# Patient Record
Sex: FEMALE | Race: OTHER | URBAN - METROPOLITAN AREA
[De-identification: names, ages, dates, MRNs, and addresses within clinical notes are randomized per-mention and may not be internally consistent; named-entity substitution may affect disease eponyms.]

---

## 2022-06-20 NOTE — PROGRESS NOTES
Toyin Tate (: 1980) is a 39 y.o. female, new to provider, here for:    ASSESSMENT/PLAN:    ICD-10-CM ICD-9-CM   1. Establishing care with new doctor, encounter for  Z76.89 V65.8   2. Routine adult health maintenance  Z00.00 V70.0   3. Need for hepatitis C screening test  Z11.59 V73.89   4. Sensitivity to the cold  R68.89 780.99   5. Uses Frisian as primary spoken language  Z78.9 V40.1   6. Multiple environmental allergies  Z91.09 V15.09   7. Seasonal allergic rhinitis, unspecified trigger  J30.2 477.9   8. Encounter for screening mammogram for malignant neoplasm of breast  Z12.31 V76.12   9. Positive PPD  R76.11 795.51     #Sensitivity to cold x years   Will eval thyroid and ferritin    #Hx PPD positive  Rec review CXR 21 without acute cardiopulmonary disease    #Multiple environmental and food allergies  With trigger of #seasonal change    Reports highly positive prior allergy testing, unsure of results (not available for review) interested in reestablishing care. Believes may have received ?immunotherapy previously (series of shots over a period of time) but was lost to follow up. Also with trigger of seasonal changes. Referred 22    #Low grade squamous intraepithelial lesion   Rec review PAP 20 - LOST to F/U with findings of LSIL (?no hpv testing)  Reviewed need to update ASAP to reevaluate and agreeable to schedule in office to help with language barrier for PAP with cotesting. #Uses Frisian as primary spoken language  [ entire visit conducted in [address] [facility] ]     Orders Placed This Encounter    DEBRA 3D THEODORE W MAMMO BI SCREENING INCL CAD     Reports prior exam (many years ago) not available for review without issue. Standing Status:   Future     Standing Expiration Date:   2023     Scheduling Instructions:      sentara     Order Specific Question:   Is Patient Pregnant?      Answer:   No     Order Specific Question:   Reason for Exam     Answer:   screening    HEMOGLOBIN A1C WITH EAG     Standing Status:   Future     Number of Occurrences:   1     Standing Expiration Date:   6/21/2023    CBC WITH AUTOMATED DIFF     Standing Status:   Future     Number of Occurrences:   1     Standing Expiration Date:   4/15/9479    METABOLIC PANEL, COMPREHENSIVE     Standing Status:   Future     Number of Occurrences:   1     Standing Expiration Date:   6/21/2023    T4, FREE     Standing Status:   Future     Number of Occurrences:   1     Standing Expiration Date:   6/21/2023    LIPID PANEL     Standing Status:   Future     Number of Occurrences:   1     Standing Expiration Date:   6/21/2023    TSH 3RD GENERATION     Standing Status:   Future     Number of Occurrences:   1     Standing Expiration Date:   6/21/2023    VITAMIN D, 25 HYDROXY     Standing Status:   Future     Number of Occurrences:   1     Standing Expiration Date:   6/21/2023    HEPATITIS C AB     Standing Status:   Future     Number of Occurrences:   1     Standing Expiration Date:   6/22/2023    FERRITIN     Standing Status:   Future     Number of Occurrences:   1     Standing Expiration Date:   6/22/2023    REFERRAL TO ALLERGY     Referral Priority:   Routine     Referral Type:   Consultation     Referral Reason:   Specialty Services Required     Number of Visits Requested:   1     Return in about 1 month (around 7/21/2022) for 30 min follow up, lab review, PAP. SUBJECTIVE/OBJECTIVE:  Extensive review of medical history and medications completed to facilitate transfer of care. No current outpatient medications on file. No current facility-administered medications for this visit. Visit Vitals  BP (!) 100/57 (BP 1 Location: Right arm, BP Patient Position: Sitting, BP Cuff Size: Adult)   Pulse 60   Temp 97.8 °F (36.6 °C) (Temporal)   Resp 16   Ht 4' 10\" (1.473 m)   Wt 112 lb 3.2 oz (50.9 kg)   SpO2 99%   BMI 23.45 kg/m²     Physical Exam  Vitals and nursing note reviewed.    Constitutional:       General: She is not in acute distress. Interventions: Face mask in place. Cardiovascular:      Rate and Rhythm: Normal rate. Pulses: Normal pulses. Heart sounds: Normal heart sounds. Pulmonary:      Effort: Pulmonary effort is normal. No respiratory distress. Breath sounds: Normal breath sounds. Neurological:      Mental Status: She is alert and oriented to person, place, and time. Gait: Gait normal.   Psychiatric:         Mood and Affect: Mood normal.         Behavior: Behavior normal.         Thought Content:  Thought content normal.         Judgment: Judgment normal.             Pretty Ortega,   Family Medicine  06/21/2022

## 2022-06-21 ENCOUNTER — APPOINTMENT (OUTPATIENT)
Dept: FAMILY MEDICINE CLINIC | Age: 42
End: 2022-06-21

## 2022-06-21 ENCOUNTER — OFFICE VISIT (OUTPATIENT)
Dept: FAMILY MEDICINE CLINIC | Age: 42
End: 2022-06-21
Payer: COMMERCIAL

## 2022-06-21 VITALS
SYSTOLIC BLOOD PRESSURE: 100 MMHG | HEIGHT: 58 IN | HEART RATE: 60 BPM | OXYGEN SATURATION: 99 % | RESPIRATION RATE: 16 BRPM | WEIGHT: 112.2 LBS | DIASTOLIC BLOOD PRESSURE: 57 MMHG | TEMPERATURE: 97.8 F | BODY MASS INDEX: 23.55 KG/M2

## 2022-06-21 DIAGNOSIS — Z11.59 NEED FOR HEPATITIS C SCREENING TEST: ICD-10-CM

## 2022-06-21 DIAGNOSIS — Z91.09 MULTIPLE ENVIRONMENTAL ALLERGIES: ICD-10-CM

## 2022-06-21 DIAGNOSIS — Z78.9 USES SPANISH AS PRIMARY SPOKEN LANGUAGE: ICD-10-CM

## 2022-06-21 DIAGNOSIS — J30.2 SEASONAL ALLERGIC RHINITIS, UNSPECIFIED TRIGGER: ICD-10-CM

## 2022-06-21 DIAGNOSIS — Z00.00 ROUTINE ADULT HEALTH MAINTENANCE: ICD-10-CM

## 2022-06-21 DIAGNOSIS — Z76.89 ESTABLISHING CARE WITH NEW DOCTOR, ENCOUNTER FOR: Primary | ICD-10-CM

## 2022-06-21 DIAGNOSIS — R76.11 POSITIVE PPD: ICD-10-CM

## 2022-06-21 DIAGNOSIS — Z12.31 ENCOUNTER FOR SCREENING MAMMOGRAM FOR MALIGNANT NEOPLASM OF BREAST: ICD-10-CM

## 2022-06-21 DIAGNOSIS — R68.89 SENSITIVITY TO THE COLD: ICD-10-CM

## 2022-06-21 PROCEDURE — 99204 OFFICE O/P NEW MOD 45 MIN: CPT | Performed by: STUDENT IN AN ORGANIZED HEALTH CARE EDUCATION/TRAINING PROGRAM

## 2022-06-21 NOTE — PATIENT INSTRUCTIONS
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 La displasia del lemuel uterino (o displasia cervical) es un cambio en algunas de las células del lemuel uterino. Estas células anormales no son cancerosas. En la [employer], la displasia desaparece por sí jose juan y no causa problemas. [place] en algunos casos, la displasia se convierte en cáncer a lo willie de un período de años. Si tim médico detecta la displasia temprano, es posible que usted pueda esperar y mone qué aspecto tiene en tim próxima prueba de detección. Si la displasia es grave, es mejor tratarla. ¿Qué ocurre cuando usted tiene displasia de lemuel uterino? La displasia del lemuel uterino suele ser causada por kathrine infección del virus del papiloma humano (VPH). Muchos tipos de VPH causan verrugas, y unos pocos tipos provocan cáncer. Al igual que la displasia, el VPH con frecuencia desaparece sin tratamiento. Si en kathrine prueba de VPH se encuentra que usted tiene un tipo de VPH de alto riesgo, el médico puede sugerirle que:  · Espere varios meses y repita la prueba para averiguar si el virus y la displasia guerrero desaparecido por sí solos. · Se felix otras pruebas. Kathrine colposcopia le permite al médico observar el lemuel uterino a través de un instrumento de [employer]. El médico también podría moose kathrine muestra de las células para analizarlas. · Trate la displasia. Incluso después del [place], el VPH puede permanecer dentro del organismo. Y la displasia a veces reaparece. Por eso es importante darle seguimiento con tim médico y hacerse pruebas de detección con regularidad. ¿Cómo puede prevenir la displasia del lemuel uterino? · Use condones de látex cada vez que tenga relaciones sexuales. Úselos desde el inicio hasta el final del contacto sexual. [place] ayudará a prevenir kathrine infección con el VPH.   · Si tiene 26 años de edad o menos, puede aplicarse la serie de vacunas contra el VPH. [place] contra los principales tipos de VPH que pueden causar cáncer del lemuel uterino. · Asegúrese de informar a tim narinder o parejas sexuales si usted tiene VPH. Incluso si no tiene síntomas, de todos modos puede transmitir el VPH a otras personas. ¿Cómo se trata la displasia del lemuel uterino? Si fuera necesario, tim médico puede tratar la displasia del lemule uterino leve congelando las células anormales o tratándolas con un láser. La displasia más grave suele tratarse extirpando las células anormales. La atención de seguimiento es kathrine parte clave de tim tratamiento y seguridad. Asegúrese de hacer y acudir a todas las citas, y llame a tim médico si está teniendo problemas. También es kathrine buena idea saber los resultados de mamta exámenes y mantener kathrine lista de los medicamentos que tawanna. ¿Dónde puede encontrar más información en inglés? 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 \"  Revisado: 8 septiembre, 2021               Versión del contenido: 13.2  © 2006-2022 Healthwise, Incorporated. Las instrucciones de cuidado fueron adaptadas bajo licencia por [employer] (which disclaims liability or warranty for this information). Si usted tiene [place] afección médica o sobre estas instrucciones, siempre pregunte a tim profesional de brunilda. Healthwise, Incorporated niega toda garantía o responsabilidad por tim uso de esta información.

## 2022-06-21 NOTE — PROGRESS NOTES
Marya Sesay is a 39 y.o. female (: 1980) presenting to address:    Chief Complaint   Patient presents with    Establish Care       Vitals:    22 1323   BP: (!) 100/57   Pulse: 60   Resp: 16   Temp: 97.8 °F (36.6 °C)   TempSrc: Temporal   SpO2: 99%   Weight: 112 lb 3.2 oz (50.9 kg)   Height: 4' 10\" (1.473 m)   PainSc:   0 - No pain       Hearing/Vision:   No exam data present    Learning Assessment:     Learning Assessment 2022   PRIMARY LEARNER Patient   HIGHEST LEVEL OF EDUCATION - PRIMARY LEARNER  GRADUATED HIGH SCHOOL OR GED   BARRIERS PRIMARY LEARNER NONE   CO-LEARNER CAREGIVER No   PRIMARY LANGUAGE [place]an   LEARNER PREFERENCE PRIMARY LISTENING   ANSWERED BY self   RELATIONSHIP SELF     Depression Screening:     3 most recent PHQ Screens 2022   Little interest or pleasure in doing things Not at all   Feeling down, depressed, irritable, or hopeless Not at all   Total Score PHQ 2 0     Fall Risk Assessment:     Fall Risk Assessment, last 12 mths 2022   Able to walk? Yes   Fall in past 12 months? 0   Do you feel unsteady? 0   Are you worried about falling 0     Abuse Screening:     Abuse Screening Questionnaire 2022   Do you ever feel afraid of your partner? N   Are you in a relationship with someone who physically or mentally threatens you? N   Is it safe for you to go home? Y     ADL Assessment:   No flowsheet data found. Coordination of Care Questionaire:   1. \"Have you been to the ER, urgent care clinic since your last visit? Hospitalized since your last visit? \" No    2. \"Have you seen or consulted any other health care providers outside of the [address] [place] [address] since your last visit? \" No     3. For patients aged 39-70: Has the patient had a colonoscopy / FIT/ Cologuard? NA - based on age      If the patient is female:    4. For patients aged 41-77: Has the patient had a mammogram within the past 2 years? No  See top three    5. For patients aged 21-65:  Has the patient had a pap smear? No    Advanced Directive:   1. Do you have an Advanced Directive? NO    2. Would you like information on Advanced Directives?  NO

## 2022-06-22 LAB
25(OH)D3 SERPL-MCNC: 33.3 NG/ML (ref 32–100)
A-G RATIO,AGRAT: 2.5 RATIO (ref 1.1–2.6)
ABSOLUTE LYMPHOCYTE COUNT, 10803: 1.8 K/UL (ref 1–4.8)
ALBUMIN SERPL-MCNC: 4.7 G/DL (ref 3.5–5)
ALP SERPL-CCNC: 85 U/L (ref 25–115)
ALT SERPL-CCNC: 14 U/L (ref 5–40)
ANION GAP SERPL CALC-SCNC: 12 MMOL/L (ref 3–15)
AST SERPL W P-5'-P-CCNC: 18 U/L (ref 10–37)
AVG GLU, 10930: 111 MG/DL (ref 91–123)
BASOPHILS # BLD: 0 K/UL (ref 0–0.2)
BASOPHILS NFR BLD: 1 % (ref 0–2)
BILIRUB SERPL-MCNC: 1.1 MG/DL (ref 0.2–1.2)
BUN SERPL-MCNC: 14 MG/DL (ref 6–22)
CALCIUM SERPL-MCNC: 9.5 MG/DL (ref 8.4–10.5)
CHLORIDE SERPL-SCNC: 102 MMOL/L (ref 98–110)
CHOLEST SERPL-MCNC: 170 MG/DL (ref 110–200)
CO2 SERPL-SCNC: 27 MMOL/L (ref 20–32)
CREAT SERPL-MCNC: 0.6 MG/DL (ref 0.5–1.2)
EOSINOPHIL # BLD: 0.1 K/UL (ref 0–0.5)
EOSINOPHIL NFR BLD: 1 % (ref 0–6)
ERYTHROCYTE [DISTWIDTH] IN BLOOD BY AUTOMATED COUNT: 13.7 % (ref 10–15.5)
FERRITIN SERPL-MCNC: 38 NG/ML (ref 10–291)
GLOBULIN,GLOB: 1.9 G/DL (ref 2–4)
GLOMERULAR FILTRATION RATE: >60 ML/MIN/1.73 SQ.M.
GLUCOSE SERPL-MCNC: 111 MG/DL (ref 70–99)
GRANULOCYTES,GRANS: 61 % (ref 40–75)
HBA1C MFR BLD HPLC: 5.5 % (ref 4.8–5.6)
HCT VFR BLD AUTO: 39.9 % (ref 35.1–46.5)
HCV AB SER IA-ACNC: NORMAL
HDLC SERPL-MCNC: 2.5 MG/DL (ref 0–5)
HDLC SERPL-MCNC: 69 MG/DL
HGB BLD-MCNC: 12 G/DL (ref 11.7–15.5)
LDL/HDL RATIO,LDHD: 1.3
LDLC SERPL CALC-MCNC: 89 MG/DL (ref 50–99)
LYMPHOCYTES, LYMLT: 32 % (ref 20–45)
MCH RBC QN AUTO: 27 PG (ref 26–34)
MCHC RBC AUTO-ENTMCNC: 30 G/DL (ref 31–36)
MCV RBC AUTO: 89 FL (ref 80–99)
MONOCYTES # BLD: 0.3 K/UL (ref 0.1–1)
MONOCYTES NFR BLD: 5 % (ref 3–12)
NEUTROPHILS # BLD AUTO: 3.4 K/UL (ref 1.8–7.7)
NON-HDL CHOLESTEROL, 011976: 101 MG/DL
PLATELET # BLD AUTO: 235 K/UL (ref 140–440)
PMV BLD AUTO: 12.6 FL (ref 9–13)
POTASSIUM SERPL-SCNC: 3.7 MMOL/L (ref 3.5–5.5)
PROT SERPL-MCNC: 6.6 G/DL (ref 6.4–8.3)
RBC # BLD AUTO: 4.51 M/UL (ref 3.8–5.2)
SODIUM SERPL-SCNC: 141 MMOL/L (ref 133–145)
T4 FREE SERPL-MCNC: 1.3 NG/DL (ref 0.9–1.8)
TRIGL SERPL-MCNC: 59 MG/DL (ref 40–149)
TSH SERPL DL<=0.005 MIU/L-ACNC: 0.55 MCU/ML (ref 0.27–4.2)
VLDLC SERPL CALC-MCNC: 12 MG/DL (ref 8–30)
WBC # BLD AUTO: 5.5 K/UL (ref 4–11)

## 2022-06-24 NOTE — PROGRESS NOTES
Not active [employer] user, can you call with result? I reviewed your recent labs and overall they look great. Your kidney, liver and thyroid were all normal. Your A1C (sugar average for 3 months) and bad cholesterol (LDL) levels were good. If you have any questions or concerns we can discuss at follow up 7/18/22.

## 2022-07-09 NOTE — PROGRESS NOTES
Fariba Stoddard (: 1980) is a 39 y.o. female, established patient, here for:    ICD-10-CM ICD-9-CM   1. Cervical cancer screening  Z12.4 V76.2   2. Uses Kittitian as primary spoken language  Z78.9 V40.1   3. Multiple environmental allergies  Z91.09 V15.09     Assessment   Plan     #Iron deficiency   Reviewed diagnosis and supplement recommendations. Goal Ferritin >75  Lab Results   Component Value Date/Time    Ferritin 38 2022 02:10 PM     #Hx PPD positive  Rec review CXR 21 without acute cardiopulmonary disease     #Multiple environmental and food allergies  With trigger of #seasonal change    Reports highly positive prior allergy testing, unsure of results (not available for review) interested in reestablishing care. Believes may have received ?immunotherapy previously (series of shots over a period of time) but was lost to follow up. Also with trigger of seasonal changes. Referred 22     #Cervical CA screening  #Low grade squamous intraepithelial lesion   Rec review PAP 20 - LOST to F/U with findings of LSIL (?no hpv testing)  HPV + PAP collected in office     #Uses Kittitian as primary spoken language  [ entire visit conducted in [address] [facility] ]         Orders Placed This Encounter    CANDICE PEREZ HPV     Standing Status:   Future     Standing Expiration Date:   7/10/2023     Order Specific Question:   Pap Source? Answer:   Cervical     Order Specific Question:   Total Hysterectomy? Answer:   No     Order Specific Question:   Supracervical Hysterectomy? Answer:   No     Order Specific Question:   Post Menopausal?     Answer:   No     Order Specific Question:   Hormone Therapy? Answer:   No     Order Specific Question:   IUD? Answer:   No     Order Specific Question:   Abnormal Bleeding? Answer:   No     Order Specific Question:   Pregnant     Answer:   No     Order Specific Question:   Post Partum?      Answer:   No     Order Specific Question:   Pap collection method? Answer:   Brush-Spatula     Return in about 1 year (around 7/18/2023) for 30 min follow up, routine care with PCP. Subjective   Last PCP visit: 6/21/2022  Since last visit:   Any changes in health, procedures, hospital visits, or specialist visits? Denies  Tolerating medications without adverse reactions? Not on medications      No current outpatient medications   Objective   Visit Vitals  BP (!) 95/59 (BP 1 Location: Right arm, BP Patient Position: Sitting, BP Cuff Size: Adult)   Pulse 71   Temp 98.3 °F (36.8 °C) (Temporal)   Resp 16   Ht 4' 10\" (1.473 m)   Wt 114 lb 4.8 oz (51.8 kg)   SpO2 97%   BMI 23.89 kg/m²     Physical Exam  Vitals and nursing note reviewed. Constitutional:       General: She is not in acute distress. Interventions: Face mask in place. Cardiovascular:      Rate and Rhythm: Normal rate. Pulses: Normal pulses. Heart sounds: Normal heart sounds. Pulmonary:      Effort: Pulmonary effort is normal. No respiratory distress. Breath sounds: Normal breath sounds. Neurological:      Mental Status: She is alert and oriented to person, place, and time. Gait: Gait normal.   Psychiatric:         Mood and Affect: Mood normal.         Behavior: Behavior normal.         Thought Content:  Thought content normal.         Judgment: Judgment normal.            Valentina Braun, DO  Family Medicine  07/18/2022

## 2022-07-09 NOTE — PATIENT INSTRUCTIONS
[facility]  Phone: [phone]  Fax: [phone]    Iron-Rich Diet: Care Instructions  Your Care Instructions     Your body needs iron to make hemoglobin. Hemoglobin is a substance in red blood cells that carries oxygen from the lungs to cells all through your body. If you do not get enough iron, your body makes fewer and smaller red blood cells. As a result, your body's cells may not get enough oxygen. Adult men need 8 milligrams of iron a day; adult women need 18 milligrams of iron a day. After menopause, women need 8 milligrams of iron a day. A pregnant woman needs 27 milligrams of iron a day. Infants and young children have higher iron needs relative to their size than other age groups. People who have lost blood because of ulcers or heavy menstrual periods may become very low in iron and may develop anemia. Most people can get the iron their bodies need by eating enough of certain iron-rich foods. Your doctor may recommend that you take an iron supplement along with eating an iron-rich diet. Follow-up care is a key part of your treatment and safety. Be sure to make and go to all appointments, and call your doctor if you are having problems. It's also a good idea to know your test results and keep a list of the medicines you take. How can you care for yourself at home? · Make iron-rich foods a part of your daily diet. Iron-rich foods include:  ? All meats, such as chicken, beef, lamb, pork, fish, and shellfish. Liver is especially high in iron. ? Leafy green vegetables. ? Raisins, peas, beans, lentils, barley, and eggs. ? Iron-fortified breakfast cereals. · Eat foods with vitamin C along with iron-rich foods. Vitamin C helps you absorb more iron from food. Drink a glass of orange juice or another citrus juice with your food. · Eat meat and vegetables or grains together. The iron in meat helps your body absorb the iron in other foods. Where can you learn more?   Go to http://www.gray.com/  Enter Z290 in the search box to learn more about \"Iron-Rich Diet: Care Instructions. \"  Current as of: September 8, 2021               Content Version: 13.2  © 7932-9043 Healthwise, [employer]. Care instructions adapted under license by [employer] (which disclaims liability or warranty for this information). If you have questions about a medical condition or this instruction, always ask your healthcare professional. [address] any warranty or liability for your use of this information.

## 2022-07-18 ENCOUNTER — OFFICE VISIT (OUTPATIENT)
Dept: FAMILY MEDICINE CLINIC | Age: 42
End: 2022-07-18
Payer: COMMERCIAL

## 2022-07-18 VITALS
SYSTOLIC BLOOD PRESSURE: 95 MMHG | TEMPERATURE: 98.3 F | HEIGHT: 58 IN | RESPIRATION RATE: 16 BRPM | BODY MASS INDEX: 23.99 KG/M2 | OXYGEN SATURATION: 97 % | HEART RATE: 71 BPM | WEIGHT: 114.3 LBS | DIASTOLIC BLOOD PRESSURE: 59 MMHG

## 2022-07-18 DIAGNOSIS — Z91.09 MULTIPLE ENVIRONMENTAL ALLERGIES: ICD-10-CM

## 2022-07-18 DIAGNOSIS — Z12.4 CERVICAL CANCER SCREENING: Primary | ICD-10-CM

## 2022-07-18 DIAGNOSIS — Z78.9 USES SPANISH AS PRIMARY SPOKEN LANGUAGE: ICD-10-CM

## 2022-07-18 PROCEDURE — 99214 OFFICE O/P EST MOD 30 MIN: CPT | Performed by: STUDENT IN AN ORGANIZED HEALTH CARE EDUCATION/TRAINING PROGRAM

## 2022-07-18 NOTE — PROGRESS NOTES
Edwar Last is a 39 y.o. female (: 1980) presenting to address:    Chief Complaint   Patient presents with    Well Woman       Vitals:    22 0711   BP: (!) 95/59   Pulse: 71   Resp: 16   Temp: 98.3 °F (36.8 °C)   TempSrc: Temporal   SpO2: 97%   Weight: 114 lb 4.8 oz (51.8 kg)   Height: 4' 10\" (1.473 m)   PainSc:   0 - No pain       Hearing/Vision:   No exam data present    Learning Assessment:     Learning Assessment 2022   PRIMARY LEARNER Patient   HIGHEST LEVEL OF EDUCATION - PRIMARY LEARNER  GRADUATED HIGH SCHOOL OR GED   BARRIERS PRIMARY LEARNER NONE   CO-LEARNER CAREGIVER No   PRIMARY LANGUAGE Malay   LEARNER PREFERENCE PRIMARY LISTENING   ANSWERED BY self   RELATIONSHIP SELF     Depression Screening:     3 most recent PHQ Screens 2022   Little interest or pleasure in doing things Not at all   Feeling down, depressed, irritable, or hopeless Not at all   Total Score PHQ 2 0     Fall Risk Assessment:     Fall Risk Assessment, last 12 mths 2022   Able to walk? Yes   Fall in past 12 months? 0   Do you feel unsteady? 0   Are you worried about falling 0     Abuse Screening:     Abuse Screening Questionnaire 2022   Do you ever feel afraid of your partner? N   Are you in a relationship with someone who physically or mentally threatens you? N   Is it safe for you to go home? Y     ADL Assessment:   No flowsheet data found. Coordination of Care Questionaire:   1. \"Have you been to the ER, urgent care clinic since your last visit? Hospitalized since your last visit? \" No    2. \"Have you seen or consulted any other health care providers outside of the [address] [place] [address] since your last visit? \" No     3. For patients aged 39-70: Has the patient had a colonoscopy / FIT/ Cologuard? NA - based on age      If the patient is female:    4. For patients aged 41-77: Has the patient had a mammogram within the past 2 years? Yes - no Care Gap present  See top three    5.  For patients aged 21-65: Has the patient had a pap smear? No    Advanced Directive:   1. Do you have an Advanced Directive? NO    2. Would you like information on Advanced Directives?  NO

## 2022-07-19 LAB
HPV HIGH RISK, 507303: NEGATIVE
PAP IMAGE GUIDED, 8900296: NORMAL

## 2022-07-19 NOTE — PROGRESS NOTES
Not active [employer] user, can you call with result? I'm happy to report your recent PAP came back negative for cancer cells and negative for HPV. We can repeat the PAP exam with HPV testing in 3-5 years to continue with monitoring.

## 2022-07-21 ENCOUNTER — TELEPHONE (OUTPATIENT)
Dept: FAMILY MEDICINE CLINIC | Age: 42
End: 2022-07-21

## 2022-07-22 ENCOUNTER — VIRTUAL VISIT (OUTPATIENT)
Dept: FAMILY MEDICINE CLINIC | Age: 42
End: 2022-07-22
Payer: COMMERCIAL

## 2022-07-22 DIAGNOSIS — U07.1 COVID-19: Primary | ICD-10-CM

## 2022-07-22 PROCEDURE — 99213 OFFICE O/P EST LOW 20 MIN: CPT | Performed by: STUDENT IN AN ORGANIZED HEALTH CARE EDUCATION/TRAINING PROGRAM

## 2022-07-22 NOTE — PROGRESS NOTES
Yan Hawkins (: 1980) is a 39 y.o. female, ESTABLISHED patient, here for a VIRTUAL VISIT to address:    ICD-10-CM ICD-9-CM   1. COVID-19  U07.1 079.89     Assessment   Plan     #Acute CoVid infection   Reviewed signs/symptoms of if or when to seek in person evaluation to consider additional testing/treatment  Onset of sx 22, sx improved since onset   Reviewed treatment recommendations for symptomatic relief. Provided information on when to end isolation per CDC guidelines and note provided     #Iron deficiency   Reviewed diagnosis and supplement recommendations. Goal Ferritin >75  Lab Results   Component Value Date/Time    Ferritin 38 2022 02:10 PM     #Multiple environmental and food allergies  With trigger of #seasonal change    Reports highly positive prior allergy testing, unsure of results (not available for review) interested in reestablishing care. Believes may have received ?immunotherapy previously (series of shots over a period of time) but was lost to follow up. Also with trigger of seasonal changes. Referred 22     #Uses Russian as primary spoken language  [ entire visit conducted in [address] [place] [address] ]            No orders of the defined types were placed in this encounter. Return in about 6 months (around 2023) for routine care with PCP, 30 min follow up. Subjective    Last PCP visit: 2022  Covid +  [place] started symptoms  Now sore throat and back pain  Using warm liquids      No current outpatient medications   Objective   There were no vitals taken for this visit. Physical Exam  Nursing note reviewed. Constitutional:       General: She is not in acute distress. Pulmonary:      Effort: Pulmonary effort is normal. No respiratory distress. Neurological:      Mental Status: She is alert and oriented to person, place, and time.    Psychiatric:         Mood and Affect: Mood normal.         Behavior: Behavior normal.          Yan Hawkins, who was evaluated through a synchronous (real-time) audio-video encounter, and/or her healthcare decision maker, is aware that it is a billable service, which includes applicable co-pays, with coverage as determined by her insurance carrier. She provided verbal consent to proceed and patient identification was verified. This visit was conducted pursuant to the emergency declaration under the [address] [place] [address], [address] [place] [address] authority and the [employer] General Act. A caregiver was present when appropriate. Ability to conduct physical exam was limited. The patient was located at home in a state where the provider was licensed to provide care.    Ondina Gaytan DO  Family Medicine  07/22/2022

## 2022-07-22 NOTE — LETTER
NOTIFICATION RETURN TO WORK / SCHOOL    7/22/2022 8:39 AM    Ms. Mera Brown  [address]      To Whom It May Concern:    Mera Brown is currently under the care of [address] [place] [address]. She was last seen 7/22/2022 and can return to work 7/23/22 without restriction. If there are questions or concerns please have the patient contact our office.         Sincerely,      Jenni Waite, DO

## 2022-08-01 ENCOUNTER — TELEPHONE (OUTPATIENT)
Dept: FAMILY MEDICINE CLINIC | Age: 42
End: 2022-08-01

## 2022-08-01 NOTE — TELEPHONE ENCOUNTER
Pt called asking to speak to the nurse, she prefers to speak [place] Island[place] please return her call at  the earliest convenience.

## 2022-08-02 ENCOUNTER — OFFICE VISIT (OUTPATIENT)
Dept: FAMILY MEDICINE CLINIC | Age: 42
End: 2022-08-02
Payer: COMMERCIAL

## 2022-08-02 VITALS
RESPIRATION RATE: 16 BRPM | DIASTOLIC BLOOD PRESSURE: 48 MMHG | SYSTOLIC BLOOD PRESSURE: 80 MMHG | WEIGHT: 115 LBS | OXYGEN SATURATION: 98 % | TEMPERATURE: 98.5 F | BODY MASS INDEX: 24.14 KG/M2 | HEIGHT: 58 IN | HEART RATE: 60 BPM

## 2022-08-02 DIAGNOSIS — N89.8 VAGINAL DISCHARGE: Primary | ICD-10-CM

## 2022-08-02 DIAGNOSIS — B37.31 CANDIDA VAGINITIS: ICD-10-CM

## 2022-08-02 PROCEDURE — 99213 OFFICE O/P EST LOW 20 MIN: CPT | Performed by: STUDENT IN AN ORGANIZED HEALTH CARE EDUCATION/TRAINING PROGRAM

## 2022-08-02 RX ORDER — FLUCONAZOLE 150 MG/1
150 TABLET ORAL DAILY
Qty: 2 TABLET | Refills: 0 | Status: SHIPPED | OUTPATIENT
Start: 2022-08-02 | End: 2022-08-09 | Stop reason: SDUPTHER

## 2022-08-02 NOTE — PROGRESS NOTES
Genia Jamil is a 39 y.o. female (: 1980) presenting to address:    Chief Complaint   Patient presents with    Personal Problem     Vaginal odor x 3 days       Vitals:    22 1126   BP: (!) 80/48   Pulse: 60   Resp: 16   Temp: 98.5 °F (36.9 °C)   TempSrc: Temporal   SpO2: 98%   Weight: 115 lb (52.2 kg)   Height: 4' 10\" (1.473 m)   PainSc:   0 - No pain       Hearing/Vision:   No results found. Learning Assessment:     Learning Assessment 2022   PRIMARY LEARNER Patient   HIGHEST LEVEL OF EDUCATION - PRIMARY LEARNER  GRADUATED HIGH SCHOOL OR GED   BARRIERS PRIMARY LEARNER NONE   CO-LEARNER CAREGIVER No   PRIMARY LANGUAGE Lebanese   LEARNER PREFERENCE PRIMARY LISTENING   ANSWERED BY self   RELATIONSHIP SELF     Depression Screening:     3 most recent PHQ Screens 2022   Little interest or pleasure in doing things Not at all   Feeling down, depressed, irritable, or hopeless Not at all   Total Score PHQ 2 0     Fall Risk Assessment:     Fall Risk Assessment, last 12 mths 2022   Able to walk? Yes   Fall in past 12 months? 0   Do you feel unsteady? 0   Are you worried about falling 0     Abuse Screening:     Abuse Screening Questionnaire 2022   Do you ever feel afraid of your partner? N   Are you in a relationship with someone who physically or mentally threatens you? N   Is it safe for you to go home? Y     ADL Assessment:   No flowsheet data found. Coordination of Care Questionaire:   1. \"Have you been to the ER, urgent care clinic since your last visit? Hospitalized since your last visit? \" No    2. \"Have you seen or consulted any other health care providers outside of the [address] [place] [address] since your last visit? \" No     3. For patients aged 39-70: Has the patient had a colonoscopy / FIT/ Cologuard? NA - based on age      If the patient is female:    4. For patients aged 41-77: Has the patient had a mammogram within the past 2 years?  Yes - no Care Gap present  See top three    5. For patients aged 21-65: Has the patient had a pap smear? Yes - no Care Gap present    Advanced Directive:   1. Do you have an Advanced Directive? NO    2. Would you like information on Advanced Directives?  NO

## 2022-08-02 NOTE — PROGRESS NOTES
Tereza Junior (: 1980) is a 39 y.o. female, ESTABLISHED patient, here for FOLLOW UP:    ICD-10-CM ICD-9-CM   1. Vaginal discharge  N89.8 623.5   2. Candida vaginitis  B37.3 112.1     Assessment   Plan     #Vaginal odor - 3 cabrera  Denies improvement with OTC - Miconazole   3 years ago similar infection  Nuswab collected in office     #Iron deficiency   Reviewed diagnosis and supplement recommendations. Goal Ferritin >75  Lab Results   Component Value Date/Time    Ferritin 38 2022 02:10 PM     #Multiple environmental and food allergies  With trigger of #seasonal change    Reports highly positive prior allergy testing, unsure of results (not available for review) interested in reestablishing care. Believes may have received ?immunotherapy previously (series of shots over a period of time) but was lost to follow up. Also with trigger of seasonal changes. Referred however found out immunotherapy would only be minimally covered with current insurance plan. #Uses Amharic as primary spoken language  [ entire visit conducted in [facility] ]              Orders Placed This Encounter    NUSWAB VG PLUS+MYCOPLASMAS,SUSANA     Standing Status:   Future     Number of Occurrences:   1     Standing Expiration Date:   8/3/2023     Order Specific Question:   Specimen source     Answer:   Vagina [280]    fluconazole (DIFLUCAN) 150 mg tablet     Sig: Take 1 Tablet by mouth in the morning for 1 day. If symptoms persist, can repeat dose 3 days after initial dose. FDA advises cautious prescribing of oral fluconazole in pregnancy. Indications: a yeast infection of the vagina and vulva     Dispense:  2 Tablet     Refill:  0     Return in about 6 months (around 2023) for routine care with PCP. Subjective   Last PCP visit: 2022  Since last visit:   Any changes in health, procedures, hospital visits, or specialist visits? See A/P  Tolerating medications without adverse reactions?  Not on chronic medications Current Outpatient Medications   Medication Instructions    fluconazole (DIFLUCAN) 150 mg, Oral, DAILY, If symptoms persist, can repeat dose 3 days after initial dose. FDA advises cautious prescribing of oral fluconazole in pregnancy. Objective   Visit Vitals  BP (!) 80/48 (BP 1 Location: Right arm, BP Patient Position: Sitting, BP Cuff Size: Adult)   Pulse 60   Temp 98.5 °F (36.9 °C) (Temporal)   Resp 16   Ht 4' 10\" (1.473 m)   Wt 115 lb (52.2 kg)   SpO2 98%   BMI 24.04 kg/m²     Physical Exam  Vitals and nursing note reviewed. Constitutional:       General: She is not in acute distress. Interventions: Face mask in place. Cardiovascular:      Rate and Rhythm: Normal rate. Pulses: Normal pulses. Heart sounds: Normal heart sounds. Pulmonary:      Effort: Pulmonary effort is normal. No respiratory distress. Breath sounds: Normal breath sounds. Neurological:      Mental Status: She is alert and oriented to person, place, and time. Gait: Gait normal.   Psychiatric:         Mood and Affect: Mood normal.         Behavior: Behavior normal.         Thought Content:  Thought content normal.         Judgment: Judgment normal.          Ami Sanford, DO  Family Medicine  08/02/2022

## 2022-08-05 LAB
BACTERIAL VAGINOSIS, NAA: NEGATIVE
CANDIDA GLABRATA, NAA, 180057: NEGATIVE
CANDIDA SPECIES, NAA: POSITIVE
CHLAMYDIA TRACHOMATIS, NAA, 180097: NEGATIVE
MYCOPLASMA GENITALIUM, SWAB: NEGATIVE
MYCOPLASMA HOMINIS, SWAB: NEGATIVE
NEISSERIA GONORRHOEAE, NAA, 180104: NEGATIVE
TRICH VAG BY NAA, 180087: NEGATIVE
UREAPLASMA SPP, SWAB: POSITIVE

## 2022-08-09 RX ORDER — FLUCONAZOLE 150 MG/1
150 TABLET ORAL DAILY
Qty: 2 TABLET | Refills: 0 | Status: SHIPPED | OUTPATIENT
Start: 2022-08-09 | End: 2022-08-10

## 2022-08-09 NOTE — TELEPHONE ENCOUNTER
Patient is requesting medication refill. As well as calling for results. Please advise. Requested Prescriptions     Pending Prescriptions Disp Refills    fluconazole (DIFLUCAN) 150 mg tablet 2 Tablet 0     Sig: Take 1 Tablet by mouth in the morning for 1 day. If symptoms persist, can repeat dose 3 days after initial dose. FDA advises cautious prescribing of oral fluconazole in pregnancy. Indications: a yeast infection of the vagina and vulva     No future appointments.   Last seen 08/02/22

## 2022-08-19 ENCOUNTER — TELEPHONE (OUTPATIENT)
Dept: FAMILY MEDICINE CLINIC | Age: 42
End: 2022-08-19

## 2022-08-19 NOTE — TELEPHONE ENCOUNTER
Pt would like a call back as soon as possible. Pt stated that she wanted to speak to Dr Gala Santana because Dr Gala Santana speaks Malagasy and she is able to address her concerns better in Turkmen.  Please advise

## 2022-08-19 NOTE — TELEPHONE ENCOUNTER
Spoke to pt and states that the Fluconazole is not working. Pt would also like to know nuswabs results. Please advise.

## 2022-08-19 NOTE — TELEPHONE ENCOUNTER
Spoke to pt and informed her that Dr. Gala Santana still need to review nuswab results and to send alternative medication for Fluconazole. Pt verbalized understanding. 90

## 2022-08-22 NOTE — TELEPHONE ENCOUNTER
I don't understand these messages. The patient was last seen for symptoms on 8/2/22. Is she saying the symptoms returned or never improved. Will likely need acute visit to discuss, tele would be ok but in person is best for repeat testing. Need to speak to manager to ask about how you can contact Patient using  service. I asked about this before but am not sure if we have a solution for this need yet.

## 2022-08-23 NOTE — TELEPHONE ENCOUNTER
Pt is scheduled for the following;    Future Appointments   Date Time Provider Alexa Whitaker   8/25/2022  2:15 PM Payton Sandhu DO [facility]

## 2022-08-23 NOTE — TELEPHONE ENCOUNTER
Spoke to pt and stated that issue was never resolved. I informed the pt to set up a follow up appointment with us for re-testing. Pt stated that she received a bill from our office and don't want to pay again. I advise the pt to contact the billing department to verify what the bill is for. Pt is frustrated stating that she never received her nuswab results either. I informed the pt that yeast was found and Dr. Sallie Wright already prescribed her with Fluconazole for this issue. Pt stated that she'd rather talk to the doctor over the phone and I advised her again to set up an appointment. Pt stated that she will call us back.

## 2022-08-25 ENCOUNTER — TELEPHONE (OUTPATIENT)
Dept: FAMILY MEDICINE CLINIC | Age: 42
End: 2022-08-25